# Patient Record
Sex: FEMALE | Race: WHITE | NOT HISPANIC OR LATINO | URBAN - METROPOLITAN AREA
[De-identification: names, ages, dates, MRNs, and addresses within clinical notes are randomized per-mention and may not be internally consistent; named-entity substitution may affect disease eponyms.]

---

## 2018-07-17 ENCOUNTER — OFFICE VISIT (OUTPATIENT)
Dept: OBSTETRICS AND GYNECOLOGY | Facility: CLINIC | Age: 43
End: 2018-07-17
Payer: COMMERCIAL

## 2018-07-17 ENCOUNTER — TRANSCRIBE ORDERS (OUTPATIENT)
Dept: SCHEDULING | Age: 43
End: 2018-07-17

## 2018-07-17 VITALS
HEIGHT: 64 IN | BODY MASS INDEX: 19.12 KG/M2 | DIASTOLIC BLOOD PRESSURE: 64 MMHG | WEIGHT: 112 LBS | SYSTOLIC BLOOD PRESSURE: 103 MMHG

## 2018-07-17 DIAGNOSIS — Z12.31 ENCOUNTER FOR SCREENING MAMMOGRAM FOR MALIGNANT NEOPLASM OF BREAST: Primary | ICD-10-CM

## 2018-07-17 DIAGNOSIS — Z11.51 SCREENING FOR HUMAN PAPILLOMAVIRUS: ICD-10-CM

## 2018-07-17 DIAGNOSIS — Z01.419 PAP SMEAR, AS PART OF ROUTINE GYNECOLOGICAL EXAMINATION: Primary | ICD-10-CM

## 2018-07-17 DIAGNOSIS — Z12.31 SCREENING MAMMOGRAM, ENCOUNTER FOR: ICD-10-CM

## 2018-07-17 PROCEDURE — G0145 SCR C/V CYTO,THINLAYER,RESCR: HCPCS | Performed by: OBSTETRICS & GYNECOLOGY

## 2018-07-17 PROCEDURE — 87624 HPV HI-RISK TYP POOLED RSLT: CPT | Performed by: OBSTETRICS & GYNECOLOGY

## 2018-07-17 PROCEDURE — 99396 PREV VISIT EST AGE 40-64: CPT | Performed by: OBSTETRICS & GYNECOLOGY

## 2018-07-17 NOTE — PROGRESS NOTES
Elin Davalos is a  43 y.o.  for annual exam.  She has regular monthly menses  with Patient's last menstrual period was 2018 (exact date).    The patient has no complaints   She uses NFP for birth control  She performs self breast exams with no concerns     Review of Systems  Constitutional: Negative  ENMT: Negative  Eyes: Negative  Respiratory: Negative  Cardio: Negative  GI:Negative  :Negative  Neuro: Negative  Psych: Negative  Integumentary: Negative  MS:Negative  Heme/Lymph: Negative  Reproductive: Negative    OB History:   Obstetric History       T2      L2     SAB0   TAB0   Ectopic0   Multiple0   Live Births2       # Outcome Date GA Lbr Martínez/2nd Weight Sex Delivery Anes PTL Lv   2 Term 09 40w0d  3.005 kg F CS-LTranv   [facility]   1 Term 04 40w0d  3.26 kg M Vag-Spont   [facility]          Medical History/GYN History History reviewed. No pertinent past medical history.    Surgical History: History reviewed. No pertinent surgical history.    Social History:   Social History     Social History   • Marital status:      Spouse name: N/A   • Number of children: N/A   • Years of education: N/A     Social History Main Topics   • Smoking status: Never Smoker   • Smokeless tobacco: Never Used   • Alcohol use None   • Drug use: Unknown   • Sexual activity: Not Asked     Other Topics Concern   • None     Social History Narrative   • None        Family History:   Family History   Problem Relation Age of Onset   • Diabetes Maternal Grandmother    • Heart disease Maternal Grandmother    • Diabetes Maternal Grandfather    • Heart disease Maternal Grandfather    • Heart disease Paternal Grandmother    • Heart disease Paternal Grandfather        Allergies: Patient has no allergy information on record.    Prior to Admission medications    Not on File           Objective     Physical Exam   Constitutional: She is oriented to person, place, and time. She appears well-developed and  well-nourished.   HEENT:   Head: Normocephalic and atraumatic.   Eyes: EOM are normal. Pupils are equal, round, and reactive to light.   Neck: Normal range of motion. No tracheal deviation present. No thyromegaly present.   Cardiovascular: Normal rate and regular rhythm.  Exam reveals no gallop and no friction rub.    No murmur heard.  Pulmonary/Chest: Effort normal and breath sounds normal. No respiratory distress. She has no wheezes. She has no rales. She exhibits no tenderness.   Abdominal: Soft. Bowel sounds are normal. She exhibits no distension and no mass. There is no tenderness. There is no rebound and no guarding.   Musculoskeletal: Normal range of motion. She exhibits no edema.   Lymphadenopathy:     She has no cervical adenopathy.   Neurological: She is alert and oriented to person, place, and time. No cranial nerve deficit.   Skin: Skin is warm and dry.   Psychiatric: She has a normal mood and affect.     BREAST: no masses or nodes palpated b/l    PELVIC:  Genitourinary: Vagina normal.   External female genitalia normal.   Normal bladder.   Vagina normal. No vaginal discharge found.   Cervix exam normal.Cervix does not exhibit motion tenderness or discharge. Uterus is normal size . Uterine contour is regular.   Adnexa normal. Right adnexum does not display tenderness, does not display fullness and palpable. Left adnexum does not display tenderness, does not display fullness and palpable.   Nursing note and vitals reviewed.    A/P:  Annual: Pap  Self breast exam taught  Screening mammo annually at 38  Contraceptive counseling: NFP and alternatives declined  Screening Dexa at 48  Screening Colonoscopy at 43  Cholesterol and TFT screening with Primary MD  The following counseling was provided: Diet and Exercise: Smoking Cessation; Drug/Alcohol Abuse/ HIV and Safe Sex/ Domestic Violence/ Vitamin Supplementation

## 2018-07-19 LAB
HPV HR 12 DNA CVX QL NAA+PROBE: NEGATIVE
HPV16 DNA SPEC QL NAA+PROBE: NEGATIVE
HPV18 DNA SPEC QL NAA+PROBE: NEGATIVE

## 2018-07-20 LAB
CASE RPRT: NORMAL
CLINICAL INFO: NORMAL
CLINICAL INFO: NORMAL
LMP START DATE: NORMAL
SPECIMEN PROCESSING COMMENT: NORMAL
THIN PREP CVX: NORMAL

## 2022-07-07 ENCOUNTER — COMPLETE EYE EXAM (OUTPATIENT)
Dept: URBAN - METROPOLITAN AREA CLINIC 79 | Facility: CLINIC | Age: 47
End: 2022-07-07

## 2022-07-07 DIAGNOSIS — H40.033: ICD-10-CM

## 2022-07-07 DIAGNOSIS — H52.03: ICD-10-CM

## 2022-07-07 PROCEDURE — 99214 OFFICE O/P EST MOD 30 MIN: CPT

## 2022-07-07 PROCEDURE — 92015 DETERMINE REFRACTIVE STATE: CPT

## 2022-07-07 PROCEDURE — 92250 FUNDUS PHOTOGRAPHY W/I&R: CPT

## 2022-07-07 PROCEDURE — 92020 GONIOSCOPY: CPT

## 2022-07-07 ASSESSMENT — VISUAL ACUITY
OS_SC: 20/100
OD_CC: 20/40
OD_CC: 20/20-1
OD_SC: 20/200
OS_CC: 20/20
OS_CC: 20/20

## 2022-07-07 ASSESSMENT — TONOMETRY
OD_IOP_MMHG: 10
OS_IOP_MMHG: 8

## 2022-10-10 NOTE — PROGRESS NOTES
Elin Davalos is a  47 y.o.  for annual exam.  She has regular monthly menses q 21-25 days  with LMP 10/7/22     She uses nothing for birth control  Denies hot flashes/vasomotor symptoms  No abd pain/cramping or bloating  No GI or  complaints  No BTB  No pelvic/vaginal pain  She performs self breast exams with no concerns     Review of Systems  Constitutional: Negative  ENMT: Negative  Eyes: Negative  Respiratory: Negative  Cardio: Negative  GI:Negative  :Negative  Neuro: Negative  Psych: Negative  Integumentary: Negative  MS:Negative  Heme/Lymph: Negative  Reproductive: Negative    OB History:   OB History    Para Term  AB Living   2 2 2 0 0 2   SAB IAB Ectopic Multiple Live Births   0 0 0 0 2      # Outcome Date GA Lbr Martínez/2nd Weight Sex Delivery Anes PTL Lv   2 Term 09 40w0d  3005 g (6 lb 10 oz) F CS-LTranv   [facility]   1 Term 04 40w0d  3260 g (7 lb 3 oz) M Vag-Spont   [facility]       Medical History/GYN History No past medical history on file.    Surgical History: No past surgical history on file.    Social History:   Social History     Socioeconomic History    Marital status:    Tobacco Use    Smoking status: Never    Smokeless tobacco: Never        Family History:   Family History   Problem Relation Age of Onset    Diabetes Maternal Grandmother     Heart disease Maternal Grandmother     Diabetes Maternal Grandfather     Heart disease Maternal Grandfather     Heart disease Paternal Grandmother     Heart disease Paternal Grandfather        Allergies: Patient has no allergy information on record.    Prior to Admission medications    Not on File           Objective     Physical Exam   Constitutional: She is oriented to person, place, and time. She appears well-developed and well-nourished.   HEENT:   Head: Normocephalic and atraumatic.   Eyes: EOM are normal. Pupils are equal, round, and reactive to light.   Neck: Normal range of motion. No tracheal deviation present.  No thyromegaly present.   Abdominal: Soft. Bowel sounds are normal. She exhibits no distension and no masses. There is no tenderness. There is no rebound and no guarding.   Musculoskeletal: Normal range of motion. She exhibits no edema.  Neurological: She is alert and oriented to person, place, and time. No cranial nerve deficit.   Skin: Skin is warm and dry.   Psychiatric: She has a normal mood and affect.     BREAST: no masses or nodes palpated b/l    PELVIC:  Genitourinary: Vagina normal.   External female genitalia normal.   Normal bladder.   Vagina normal. No vaginal discharge found.   Cervix exam normal.Cervix does not exhibit motion tenderness or discharge. Uterus is normal size . Uterine contour is regular.   Adnexa normal. Right adnexum no tenderness to palpation and no masses palpated, . Left adnexum no tenderness to palpation and no masses palpable  Nursing note and vitals reviewed.    A/P:  Annual: Pap  Self breast exam taught  Screening mammo annually at 38  Contraceptive counseling: NFP  Screening Dexa at 48  Screening Colonoscopy referred to GI  Cholesterol and TFT screening with Primary MD  The following counseling was provided: Diet and Exercise: Smoking Cessation; Drug/Alcohol Abuse/ HIV and Safe Sex/ Domestic Violence/ Vitamin Supplementation

## 2022-10-11 ENCOUNTER — OFFICE VISIT (OUTPATIENT)
Dept: OBSTETRICS AND GYNECOLOGY | Facility: CLINIC | Age: 47
End: 2022-10-11
Payer: COMMERCIAL

## 2022-10-11 VITALS — SYSTOLIC BLOOD PRESSURE: 112 MMHG | DIASTOLIC BLOOD PRESSURE: 74 MMHG | WEIGHT: 114 LBS | BODY MASS INDEX: 19.57 KG/M2

## 2022-10-11 DIAGNOSIS — Z12.31 SCREENING MAMMOGRAM FOR BREAST CANCER: Primary | ICD-10-CM

## 2022-10-11 DIAGNOSIS — Z01.419 GYNECOLOGIC EXAM NORMAL: ICD-10-CM

## 2022-10-11 DIAGNOSIS — Z11.51 SCREENING FOR HUMAN PAPILLOMAVIRUS (HPV): ICD-10-CM

## 2022-10-11 PROCEDURE — 3008F BODY MASS INDEX DOCD: CPT | Performed by: OBSTETRICS & GYNECOLOGY

## 2022-10-11 PROCEDURE — 99396 PREV VISIT EST AGE 40-64: CPT | Performed by: OBSTETRICS & GYNECOLOGY

## 2022-10-14 LAB
CLINICAL INFO: NORMAL
CYTO CVX: NORMAL
CYTOLOGIST CVX/VAG CYTO: NORMAL
DATE OF PREVIOUS PAP: NORMAL
DATE PREVIOUS BX: NORMAL
HPV E6+E7 MRNA CVX QL NAA+PROBE: NOT DETECTED
LMP START DATE: NORMAL
QUEST COMMENT (PAP): NORMAL
SPECIMEN SOURCE CVX/VAG CYTO: NORMAL
STAT OF ADQ CVX/VAG CYTO-IMP: NORMAL

## 2023-07-14 ENCOUNTER — TELEPHONE (OUTPATIENT)
Dept: OBSTETRICS AND GYNECOLOGY | Facility: CLINIC | Age: 48
End: 2023-07-14
Payer: COMMERCIAL

## 2023-07-14 DIAGNOSIS — R92.8 ABNORMAL MAMMOGRAM: Primary | ICD-10-CM

## 2023-07-14 NOTE — TELEPHONE ENCOUNTER
Called the patient  and left a VM to call the office.Placed order for 6 month follow up bilateral diagnostic mammogram and US.

## 2024-02-16 ENCOUNTER — TELEPHONE (OUTPATIENT)
Dept: OBSTETRICS AND GYNECOLOGY | Facility: CLINIC | Age: 49
End: 2024-02-16
Payer: COMMERCIAL

## 2024-02-16 ENCOUNTER — HOSPITAL ENCOUNTER (OUTPATIENT)
Dept: HOSPITAL 99 - WDC | Age: 49
End: 2024-02-16
Payer: COMMERCIAL

## 2024-02-16 DIAGNOSIS — R92.8: Primary | ICD-10-CM

## 2024-02-16 DIAGNOSIS — R92.8 ABNORMAL MAMMOGRAM: Primary | ICD-10-CM

## 2024-02-16 NOTE — TELEPHONE ENCOUNTER
[facility] called and her mammogram orders were wrong. She gave me the correct orders and I placed the orders. Can you please sign off on them. She is Dr Nelson's patient and is at the breast center now waiting. Thank you.

## 2024-02-28 ENCOUNTER — TELEPHONE (OUTPATIENT)
Dept: OBSTETRICS AND GYNECOLOGY | Facility: CLINIC | Age: 49
End: 2024-02-28
Payer: COMMERCIAL

## 2024-02-28 DIAGNOSIS — R92.8 ABNORMAL MAMMOGRAM: Primary | ICD-10-CM

## 2024-02-28 NOTE — TELEPHONE ENCOUNTER
Patient called her daughter had a lab work up at [facility] for PCOS symptoms and the recommendation was that she see a Gynecologist or an Endocrinologist. She asked if you would see her daughter or if you could recommend a pediatric gynecologist.

## 2024-02-28 NOTE — TELEPHONE ENCOUNTER
Called the patient and told her that her mammo was stable . Radiology recommends follow up in 6 months with a bilateral diagnostic mammo and US. I poaced the orders for both.

## 2025-08-25 ENCOUNTER — TELEPHONE (OUTPATIENT)
Dept: OBSTETRICS AND GYNECOLOGY | Facility: CLINIC | Age: 50
End: 2025-08-25
Payer: COMMERCIAL

## 2025-08-25 DIAGNOSIS — R92.8 ABNORMAL MAMMOGRAM: Primary | ICD-10-CM
